# Patient Record
Sex: MALE | Employment: UNEMPLOYED | URBAN - NONMETROPOLITAN AREA
[De-identification: names, ages, dates, MRNs, and addresses within clinical notes are randomized per-mention and may not be internally consistent; named-entity substitution may affect disease eponyms.]

---

## 2024-03-16 ENCOUNTER — HOSPITAL ENCOUNTER (EMERGENCY)
Age: 29
Discharge: HOME | End: 2024-03-16
Attending: EMERGENCY MEDICINE

## 2024-03-16 VITALS
BODY MASS INDEX: 22.73 KG/M2 | WEIGHT: 150 LBS | DIASTOLIC BLOOD PRESSURE: 77 MMHG | HEIGHT: 68 IN | HEART RATE: 74 BPM | OXYGEN SATURATION: 100 % | SYSTOLIC BLOOD PRESSURE: 122 MMHG | TEMPERATURE: 98 F | RESPIRATION RATE: 18 BRPM

## 2024-03-16 VITALS
WEIGHT: 150 LBS | HEART RATE: 68 BPM | HEIGHT: 68 IN | RESPIRATION RATE: 18 BRPM | DIASTOLIC BLOOD PRESSURE: 72 MMHG | BODY MASS INDEX: 22.73 KG/M2 | SYSTOLIC BLOOD PRESSURE: 123 MMHG | OXYGEN SATURATION: 99 % | TEMPERATURE: 98.1 F

## 2024-03-16 DIAGNOSIS — Z00.00 GENERAL MEDICAL EXAMINATION: Primary | ICD-10-CM

## 2024-03-16 DIAGNOSIS — H57.8A2 FOREIGN BODY SENSATION, LEFT EYE: Primary | ICD-10-CM

## 2024-03-16 DIAGNOSIS — R46.89 VERBALLY ABUSIVE BEHAVIOR: ICD-10-CM

## 2024-03-16 PROCEDURE — 99281 EMR DPT VST MAYX REQ PHY/QHP: CPT

## 2024-03-16 PROCEDURE — 99283 EMERGENCY DEPT VISIT LOW MDM: CPT

## 2024-03-16 PROCEDURE — 6370000000 HC RX 637 (ALT 250 FOR IP): Performed by: EMERGENCY MEDICINE

## 2024-03-16 RX ORDER — TETRACAINE HYDROCHLORIDE 5 MG/ML
1 SOLUTION OPHTHALMIC ONCE
Status: COMPLETED | OUTPATIENT
Start: 2024-03-16 | End: 2024-03-16

## 2024-03-16 RX ORDER — ERYTHROMYCIN 5 MG/G
1 OINTMENT OPHTHALMIC 2 TIMES DAILY
Qty: 1 G | Refills: 0 | Status: SHIPPED | OUTPATIENT
Start: 2024-03-16 | End: 2024-03-21

## 2024-03-16 RX ORDER — ERYTHROMYCIN 5 MG/G
1 OINTMENT OPHTHALMIC 2 TIMES DAILY
Qty: 1 G | Refills: 0 | Status: SHIPPED | OUTPATIENT
Start: 2024-03-16 | End: 2024-03-16

## 2024-03-16 RX ADMIN — TETRACAINE HYDROCHLORIDE 1 DROP: 5 SOLUTION OPHTHALMIC at 15:13

## 2024-03-16 RX ADMIN — FLUORESCEIN SODIUM 1 MG: 1 STRIP OPHTHALMIC at 15:20

## 2024-03-16 ASSESSMENT — LIFESTYLE VARIABLES
HOW OFTEN DO YOU HAVE A DRINK CONTAINING ALCOHOL: NEVER
HOW MANY STANDARD DRINKS CONTAINING ALCOHOL DO YOU HAVE ON A TYPICAL DAY: PATIENT DOES NOT DRINK

## 2024-03-16 ASSESSMENT — PAIN DESCRIPTION - ORIENTATION: ORIENTATION: LEFT

## 2024-03-16 ASSESSMENT — PAIN DESCRIPTION - LOCATION: LOCATION: EYE

## 2024-03-16 ASSESSMENT — PAIN SCALES - GENERAL: PAINLEVEL_OUTOF10: 10

## 2024-03-16 ASSESSMENT — PAIN - FUNCTIONAL ASSESSMENT: PAIN_FUNCTIONAL_ASSESSMENT: 0-10

## 2024-03-16 NOTE — ED PROVIDER NOTES
[facility] EMERGENCY DEPT  EMERGENCY DEPARTMENT ENCOUNTER      Pt Name: Miguel Lowery  MRN: [id number]  Birthdate 1995  Date of evaluation: 3/16/2024  Provider: Aamir Horner MD  4:38 PM    CHIEF COMPLAINT       Chief Complaint   Patient presents with    Eye Pain         HISTORY OF PRESENT ILLNESS    Miguel Lowery is a 28 y.o. male who presents to the emergency department general medical exam.     The history is provided by the patient and medical records.       Nursing Notes were reviewed.    REVIEW OF SYSTEMS       Review of Systems   All other systems reviewed and are negative.      Except as noted above the remainder of the review of systems was reviewed and negative.       PAST MEDICAL HISTORY   History reviewed. No pertinent past medical history.      SURGICAL HISTORY     History reviewed. No pertinent surgical history.      CURRENT MEDICATIONS       Previous Medications    ERYTHROMYCIN (ROMYCIN) 5 MG/GM OPHTHALMIC OINTMENT    Place 1 cm into the left eye in the morning and at bedtime for 5 days       ALLERGIES     Patient has no known allergies.    FAMILY HISTORY     History reviewed. No pertinent family history.       SOCIAL HISTORY       Social History     Socioeconomic History    Marital status: Single     Spouse name: None    Number of children: None    Years of education: None    Highest education level: None   Tobacco Use    Smoking status: Never    Smokeless tobacco: Never   Substance and Sexual Activity    Alcohol use: Not Currently    Drug use: Not Currently    Sexual activity: Not Currently       SCREENINGS         Holli Coma Scale  Eye Opening: Spontaneous  Best Verbal Response: Oriented  Best Motor Response: Obeys commands  [place] Coma Scale Score: 15                     CIWA Assessment  BP: 122/77  Pulse: 74                 PHYSICAL EXAM       ED Triage Vitals   BP Temp Temp src Pulse Resp SpO2 Height Weight   -- -- -- -- -- -- -- --       Physical Exam  Vitals and nursing note  1634   BP: 122/77   Pulse: 74   Resp: 18   Temp: 98 °F (36.7 °C)   SpO2: 100%   Weight: 68 kg (150 lb)   Height: 1.727 m (5' 8\")           Medical Decision Making  No change from the exam 3 minutes ago.     Left eye was irrigated with 10 cc of normal saline with improvement.     During irrigation patient did tell me that he hates doctors and has bad experiences with them to include me. I told him that he was provided appropriate ED care.           FINAL IMPRESSION      1. General medical examination          DISPOSITION/PLAN   DISPOSITION Decision To Discharge 03/16/2024 04:30:01 PM      PATIENT REFERRED TO:  No follow-up provider specified.    DISCHARGE MEDICATIONS:  New Prescriptions    No medications on file     Controlled Substances Monitoring:          No data to display                (Please note that portions of this note were completed with a voice recognition program.  Efforts were made to edit the dictations but occasionally words are mis-transcribed.)    Aamir Horner MD (electronically signed)  Attending Emergency Physician            Aamir Horner MD  03/16/24 2862       Aamir Horner MD  03/16/24 8301

## 2024-03-16 NOTE — ED TRIAGE NOTES
Pt d/c from the ED approx 5 mins ago, states he signed back in to have his eye rinsed out, MD has explained that he used drops to flush eye.   Pt is agitated and states \"ya'll aren't helping me\"  pt being rude to admissions, fussing at them    Pt being rude to security   pt refusing to come back to exam room for triage   pt then states \"well if you're going to bill me again, I might as well get my vitals taken\"
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
## 2024-03-16 NOTE — DISCHARGE INSTRUCTIONS
Make an appointment with the optometrist to ensure your vision recovers.  Use the antibiotic as prescribed.